# Patient Record
Sex: MALE | URBAN - METROPOLITAN AREA
[De-identification: names, ages, dates, MRNs, and addresses within clinical notes are randomized per-mention and may not be internally consistent; named-entity substitution may affect disease eponyms.]

---

## 2024-03-13 ENCOUNTER — APPOINTMENT (OUTPATIENT)
Dept: URBAN - METROPOLITAN AREA CLINIC 301 | Age: 79
Setting detail: DERMATOLOGY
End: 2024-03-13

## 2024-03-13 VITALS — WEIGHT: 200 LBS | HEIGHT: 72 IN

## 2024-03-13 DIAGNOSIS — D69.2 OTHER NONTHROMBOCYTOPENIC PURPURA: ICD-10-CM

## 2024-03-13 DIAGNOSIS — I87.2 VENOUS INSUFFICIENCY (CHRONIC) (PERIPHERAL): ICD-10-CM

## 2024-03-13 PROCEDURE — OTHER OTC TREATMENT REGIMEN: OTHER

## 2024-03-13 PROCEDURE — OTHER REASSURANCE: OTHER

## 2024-03-13 PROCEDURE — OTHER MIPS QUALITY: OTHER

## 2024-03-13 PROCEDURE — OTHER COUNSELING: OTHER

## 2024-03-13 PROCEDURE — OTHER PRESCRIPTION MEDICATION MANAGEMENT: OTHER

## 2024-03-13 PROCEDURE — OTHER PRESCRIPTION: OTHER

## 2024-03-13 PROCEDURE — 99203 OFFICE O/P NEW LOW 30 MIN: CPT

## 2024-03-13 RX ORDER — CLOBETASOL PROPIONATE 0.5 MG/G
CREAM TOPICAL
Qty: 60 | Refills: 1 | Status: ERX | COMMUNITY
Start: 2024-03-13

## 2024-03-13 ASSESSMENT — LOCATION ZONE DERM
LOCATION ZONE: ARM
LOCATION ZONE: LEG

## 2024-03-13 ASSESSMENT — LOCATION SIMPLE DESCRIPTION DERM
LOCATION SIMPLE: RIGHT FOREARM
LOCATION SIMPLE: LEFT FOREARM
LOCATION SIMPLE: RIGHT PRETIBIAL REGION

## 2024-03-13 ASSESSMENT — LOCATION DETAILED DESCRIPTION DERM
LOCATION DETAILED: RIGHT DISTAL PRETIBIAL REGION
LOCATION DETAILED: RIGHT DISTAL DORSAL FOREARM
LOCATION DETAILED: LEFT DISTAL DORSAL FOREARM

## 2024-03-13 NOTE — HPI: RASH
What Type Of Note Output Would You Prefer (Optional)?: Bullet Format
Is This A New Presentation, Or A Follow-Up?: Rash
Additional History: Pt was treated for shingles in September.

## 2024-03-13 NOTE — PROCEDURE: OTC TREATMENT REGIMEN
Detail Level: Generalized
Patient Specific Otc Recommendations (Will Not Stick From Patient To Patient): Melanie Daly, Bromelain supplements

## 2024-03-13 NOTE — PROCEDURE: PRESCRIPTION MEDICATION MANAGEMENT
Render In Strict Bullet Format?: No
Initiate Treatment: clobetasol 0.05 % topical cream \\nQuantity: 60.0 g  Days Supply: 30\\nSig: Apply to affected areas on lower legs bid for four weeks
Plan: Compression socks 15-20 mmHg
Detail Level: Simple

## 2024-04-10 ENCOUNTER — APPOINTMENT (OUTPATIENT)
Dept: URBAN - METROPOLITAN AREA CLINIC 301 | Age: 79
Setting detail: DERMATOLOGY
End: 2024-04-10

## 2024-04-10 VITALS — WEIGHT: 200 LBS | HEIGHT: 72 IN

## 2024-04-10 DIAGNOSIS — I87.2 VENOUS INSUFFICIENCY (CHRONIC) (PERIPHERAL): ICD-10-CM

## 2024-04-10 PROCEDURE — 99213 OFFICE O/P EST LOW 20 MIN: CPT

## 2024-04-10 PROCEDURE — OTHER COUNSELING: OTHER

## 2024-04-10 PROCEDURE — OTHER REASSURANCE: OTHER

## 2024-04-10 PROCEDURE — OTHER PRESCRIPTION MEDICATION MANAGEMENT: OTHER

## 2024-04-10 PROCEDURE — OTHER MIPS QUALITY: OTHER

## 2024-04-10 ASSESSMENT — LOCATION DETAILED DESCRIPTION DERM
LOCATION DETAILED: LEFT DISTAL PRETIBIAL REGION
LOCATION DETAILED: RIGHT DISTAL PRETIBIAL REGION

## 2024-04-10 ASSESSMENT — LOCATION SIMPLE DESCRIPTION DERM
LOCATION SIMPLE: RIGHT PRETIBIAL REGION
LOCATION SIMPLE: LEFT PRETIBIAL REGION

## 2024-04-10 ASSESSMENT — LOCATION ZONE DERM: LOCATION ZONE: LEG

## 2024-04-10 NOTE — PROCEDURE: PRESCRIPTION MEDICATION MANAGEMENT
Continue Regimen: Clobetasol BID x 2 wks PRN flares
Render In Strict Bullet Format?: No
Detail Level: Zone

## 2024-10-10 ENCOUNTER — APPOINTMENT (OUTPATIENT)
Dept: URBAN - METROPOLITAN AREA CLINIC 301 | Age: 79
Setting detail: DERMATOLOGY
End: 2024-10-10

## 2024-10-10 VITALS — HEIGHT: 72 IN | WEIGHT: 185 LBS

## 2024-10-10 DIAGNOSIS — L89 PRESSURE ULCER: ICD-10-CM

## 2024-10-10 DIAGNOSIS — I87.2 VENOUS INSUFFICIENCY (CHRONIC) (PERIPHERAL): ICD-10-CM

## 2024-10-10 PROBLEM — L89.322 PRESSURE ULCER OF LEFT BUTTOCK, STAGE 2: Status: ACTIVE | Noted: 2024-10-10

## 2024-10-10 PROBLEM — L89.312 PRESSURE ULCER OF RIGHT BUTTOCK, STAGE 2: Status: ACTIVE | Noted: 2024-10-10

## 2024-10-10 PROCEDURE — OTHER COUNSELING: OTHER

## 2024-10-10 PROCEDURE — OTHER REASSURANCE: OTHER

## 2024-10-10 PROCEDURE — OTHER PRESCRIPTION: OTHER

## 2024-10-10 PROCEDURE — OTHER OTC TREATMENT REGIMEN: OTHER

## 2024-10-10 PROCEDURE — OTHER MIPS QUALITY: OTHER

## 2024-10-10 PROCEDURE — 99213 OFFICE O/P EST LOW 20 MIN: CPT

## 2024-10-10 PROCEDURE — OTHER PRESCRIPTION MEDICATION MANAGEMENT: OTHER

## 2024-10-10 RX ORDER — MUPIROCIN 20 MG/G
OINTMENT TOPICAL
Qty: 22 | Refills: 1 | Status: ERX | COMMUNITY
Start: 2024-10-10

## 2024-10-10 RX ORDER — GUAIFENESIN/PSEUDOEPHEDRNE HCL 600MG-60MG
TABLET, EXTENDED RELEASE 12 HR ORAL
Qty: 15 | Refills: 1 | Status: ERX | COMMUNITY
Start: 2024-10-10

## 2024-10-10 ASSESSMENT — LOCATION DETAILED DESCRIPTION DERM
LOCATION DETAILED: RIGHT DISTAL PRETIBIAL REGION
LOCATION DETAILED: RIGHT MEDIAL BUTTOCK
LOCATION DETAILED: LEFT MEDIAL BUTTOCK
LOCATION DETAILED: LEFT DISTAL PRETIBIAL REGION

## 2024-10-10 ASSESSMENT — LOCATION SIMPLE DESCRIPTION DERM
LOCATION SIMPLE: LEFT BUTTOCK
LOCATION SIMPLE: LEFT PRETIBIAL REGION
LOCATION SIMPLE: RIGHT BUTTOCK
LOCATION SIMPLE: RIGHT PRETIBIAL REGION

## 2024-10-10 ASSESSMENT — LOCATION ZONE DERM
LOCATION ZONE: TRUNK
LOCATION ZONE: LEG

## 2024-10-10 NOTE — PROCEDURE: PRESCRIPTION MEDICATION MANAGEMENT
Continue Regimen: Clobetasol BID x 2 wks PRN flares
Render In Strict Bullet Format?: No
Detail Level: Zone
Detail Level: Simple
Initiate Treatment: Manuka Honey 100 % topical gel \\nQuantity: 15.0 ml  Days Supply: 30\\nSig: Apply to AA on buttocks 1-2 times a day\\n\\nmupirocin 2 % topical ointment \\nQuantity: 22.0 g  Days Supply: 30\\nSig: Apply to AA on buttocks 1-2 times QD

## 2024-10-10 NOTE — PROCEDURE: OTC TREATMENT REGIMEN
Detail Level: Zone
Patient Specific Otc Recommendations (Will Not Stick From Patient To Patient): Hydrocolloid bandages

## 2024-11-12 ENCOUNTER — APPOINTMENT (OUTPATIENT)
Dept: URBAN - METROPOLITAN AREA CLINIC 301 | Age: 79
Setting detail: DERMATOLOGY
End: 2024-11-12

## 2024-11-12 VITALS — WEIGHT: 182 LBS | HEIGHT: 72 IN

## 2024-11-12 DIAGNOSIS — L89 PRESSURE ULCER: ICD-10-CM

## 2024-11-12 PROBLEM — L89.321 PRESSURE ULCER OF LEFT BUTTOCK, STAGE 1: Status: ACTIVE | Noted: 2024-11-12

## 2024-11-12 PROCEDURE — OTHER MIPS QUALITY: OTHER

## 2024-11-12 PROCEDURE — OTHER COUNSELING: OTHER

## 2024-11-12 PROCEDURE — OTHER REASSURANCE: OTHER

## 2024-11-12 PROCEDURE — 99213 OFFICE O/P EST LOW 20 MIN: CPT

## 2024-11-12 PROCEDURE — OTHER PRESCRIPTION MEDICATION MANAGEMENT: OTHER

## 2024-11-12 ASSESSMENT — LOCATION SIMPLE DESCRIPTION DERM: LOCATION SIMPLE: LEFT BUTTOCK

## 2024-11-12 ASSESSMENT — LOCATION ZONE DERM: LOCATION ZONE: TRUNK

## 2024-11-12 ASSESSMENT — LOCATION DETAILED DESCRIPTION DERM: LOCATION DETAILED: LEFT MEDIAL BUTTOCK

## 2024-12-10 ENCOUNTER — APPOINTMENT (OUTPATIENT)
Dept: URBAN - METROPOLITAN AREA CLINIC 301 | Age: 79
Setting detail: DERMATOLOGY
End: 2024-12-10

## 2024-12-10 VITALS — HEIGHT: 72 IN | WEIGHT: 180 LBS

## 2024-12-10 DIAGNOSIS — D69.2 OTHER NONTHROMBOCYTOPENIC PURPURA: ICD-10-CM

## 2024-12-10 DIAGNOSIS — L20.89 OTHER ATOPIC DERMATITIS: ICD-10-CM

## 2024-12-10 DIAGNOSIS — L89 PRESSURE ULCER: ICD-10-CM

## 2024-12-10 PROBLEM — L89.321 PRESSURE ULCER OF LEFT BUTTOCK, STAGE 1: Status: ACTIVE | Noted: 2024-12-10

## 2024-12-10 PROCEDURE — OTHER REASSURANCE: OTHER

## 2024-12-10 PROCEDURE — OTHER MIPS QUALITY: OTHER

## 2024-12-10 PROCEDURE — 99213 OFFICE O/P EST LOW 20 MIN: CPT

## 2024-12-10 PROCEDURE — OTHER COUNSELING: OTHER

## 2024-12-10 PROCEDURE — OTHER PRESCRIPTION: OTHER

## 2024-12-10 PROCEDURE — OTHER PRESCRIPTION MEDICATION MANAGEMENT: OTHER

## 2024-12-10 RX ORDER — TRIAMCINOLONE ACETONIDE 1 MG/G
CREAM TOPICAL
Qty: 454 | Refills: 0 | Status: ERX | COMMUNITY
Start: 2024-12-10

## 2024-12-10 ASSESSMENT — LOCATION DETAILED DESCRIPTION DERM
LOCATION DETAILED: LEFT PROXIMAL PRETIBIAL REGION
LOCATION DETAILED: RIGHT PROXIMAL PRETIBIAL REGION
LOCATION DETAILED: RIGHT PROXIMAL DORSAL FOREARM
LOCATION DETAILED: LEFT MEDIAL BUTTOCK
LOCATION DETAILED: LEFT PROXIMAL DORSAL FOREARM

## 2024-12-10 ASSESSMENT — LOCATION ZONE DERM
LOCATION ZONE: LEG
LOCATION ZONE: TRUNK
LOCATION ZONE: ARM

## 2024-12-10 ASSESSMENT — ITCH NUMERIC RATING SCALE: HOW SEVERE IS YOUR ITCHING?: 7

## 2024-12-10 ASSESSMENT — SEVERITY ASSESSMENT: SEVERITY: MILD TO MODERATE

## 2024-12-10 ASSESSMENT — LOCATION SIMPLE DESCRIPTION DERM
LOCATION SIMPLE: RIGHT PRETIBIAL REGION
LOCATION SIMPLE: LEFT FOREARM
LOCATION SIMPLE: LEFT PRETIBIAL REGION
LOCATION SIMPLE: LEFT BUTTOCK
LOCATION SIMPLE: RIGHT FOREARM

## 2024-12-10 ASSESSMENT — BSA RASH: BSA RASH: 4

## 2024-12-10 ASSESSMENT — SEVERITY ASSESSMENT 2020: SEVERITY 2020: MILD

## 2024-12-10 NOTE — PROCEDURE: PRESCRIPTION MEDICATION MANAGEMENT
Render In Strict Bullet Format?: No
Detail Level: Simple
Initiate Treatment: triamcinolone acetonide 0.1 % topical cream \\nQuantity: 454.0 g  Days Supply: 30\\nSig: Apply a thin layer to rash on arms BID x 4 wks
Continue Regimen: Manuka honey, mupirocin

## 2025-01-16 ENCOUNTER — APPOINTMENT (OUTPATIENT)
Dept: URBAN - METROPOLITAN AREA CLINIC 301 | Age: 80
Setting detail: DERMATOLOGY
End: 2025-01-16

## 2025-01-16 VITALS — HEIGHT: 72 IN | WEIGHT: 185 LBS

## 2025-01-16 DIAGNOSIS — L89 PRESSURE ULCER: ICD-10-CM

## 2025-01-16 PROBLEM — L89.321 PRESSURE ULCER OF LEFT BUTTOCK, STAGE 1: Status: ACTIVE | Noted: 2025-01-16

## 2025-01-16 PROCEDURE — OTHER REASSURANCE: OTHER

## 2025-01-16 PROCEDURE — 99213 OFFICE O/P EST LOW 20 MIN: CPT

## 2025-01-16 PROCEDURE — OTHER COUNSELING: OTHER

## 2025-01-16 PROCEDURE — OTHER MIPS QUALITY: OTHER

## 2025-01-16 PROCEDURE — OTHER PRESCRIPTION MEDICATION MANAGEMENT: OTHER

## 2025-01-16 ASSESSMENT — LOCATION DETAILED DESCRIPTION DERM: LOCATION DETAILED: LEFT MEDIAL BUTTOCK

## 2025-01-16 ASSESSMENT — AREA OF ULCER IN CM2: TOTAL AREA OF ULCER IN CM2: 0

## 2025-01-16 ASSESSMENT — LOCATION SIMPLE DESCRIPTION DERM: LOCATION SIMPLE: LEFT BUTTOCK

## 2025-01-16 ASSESSMENT — LOCATION ZONE DERM: LOCATION ZONE: TRUNK

## 2025-01-16 ASSESSMENT — SEVERITY ASSESSMENT: SEVERITY: CLEAR

## 2025-01-16 NOTE — PROCEDURE: PRESCRIPTION MEDICATION MANAGEMENT
Render In Strict Bullet Format?: No
Detail Level: Simple
Discontinue Regimen: Manuka honey, mupirocin